# Patient Record
Sex: MALE
[De-identification: names, ages, dates, MRNs, and addresses within clinical notes are randomized per-mention and may not be internally consistent; named-entity substitution may affect disease eponyms.]

---

## 2019-10-24 ENCOUNTER — APPOINTMENT (OUTPATIENT)
Dept: ORTHOPEDIC SURGERY | Facility: CLINIC | Age: 49
End: 2019-10-24

## 2019-10-24 ENCOUNTER — APPOINTMENT (OUTPATIENT)
Dept: ORTHOPEDIC SURGERY | Facility: CLINIC | Age: 49
End: 2019-10-24
Payer: COMMERCIAL

## 2019-10-24 VITALS — WEIGHT: 220 LBS | HEIGHT: 70 IN | BODY MASS INDEX: 31.5 KG/M2

## 2019-10-24 DIAGNOSIS — Z86.39 PERSONAL HISTORY OF OTHER ENDOCRINE, NUTRITIONAL AND METABOLIC DISEASE: ICD-10-CM

## 2019-10-24 DIAGNOSIS — Z87.39 PERSONAL HISTORY OF OTHER DISEASES OF THE MUSCULOSKELETAL SYSTEM AND CONNECTIVE TISSUE: ICD-10-CM

## 2019-10-24 DIAGNOSIS — M25.511 PAIN IN RIGHT SHOULDER: ICD-10-CM

## 2019-10-24 DIAGNOSIS — M25.512 PAIN IN RIGHT SHOULDER: ICD-10-CM

## 2019-10-24 PROBLEM — Z00.00 ENCOUNTER FOR PREVENTIVE HEALTH EXAMINATION: Status: ACTIVE | Noted: 2019-10-24

## 2019-10-24 PROCEDURE — 20611 DRAIN/INJ JOINT/BURSA W/US: CPT | Mod: 50

## 2019-10-24 PROCEDURE — 73030 X-RAY EXAM OF SHOULDER: CPT | Mod: 50

## 2019-10-24 PROCEDURE — 99204 OFFICE O/P NEW MOD 45 MIN: CPT | Mod: 25

## 2019-10-24 RX ORDER — HYALURONATE SODIUM, STABILIZED 88 MG/4 ML
88 SYRINGE (ML) INTRAARTICULAR
Qty: 1 | Refills: 0 | Status: ACTIVE | COMMUNITY
Start: 2019-10-24 | End: 1900-01-01

## 2019-10-24 RX ORDER — ROSUVASTATIN CALCIUM 10 MG/1
10 TABLET, FILM COATED ORAL
Refills: 0 | Status: ACTIVE | COMMUNITY

## 2019-10-24 NOTE — REVIEW OF SYSTEMS
[Joint Stiffness] : joint stiffness [Joint Pain] : joint pain [Feeling Tired] : fatigue [Anxiety] : anxiety [Sleep Disturbances] : ~T sleep disturbances [Recent Weight Gain (___ Lbs)] : recent ~M [unfilled] lbs weight gain [Muscle Weakness] : muscle weakness

## 2019-10-24 NOTE — PHYSICAL EXAM
[de-identified] : PHYSICAL EXAM LEFT AND RIGHT SHOULDER\par \par NORMAL POSTURE \par AROM \par LEFT  130 / 130 / 80 / 20\par RIGHT   140 / 140 / 90 / 30\par TENDER: SA REGION / GH JOINT \par \par SPECIAL TESTING :\par MORA - POSITIVE \par JASPAL - POSITIVE \par SPEED TEST - POSITIVE\par \par QUIROGA - NEGATIVE \par APPREHENSION AND SUPPRESSION - NEGATIVE \par \par RC STRENGTH TESTING \par SS:  5/5\par SUB 5/5\par IS     5/5\par BICEPS  5/5\par \par SENSATION  - GROSSLY INTACT\par \par \par  [de-identified] : LEFT SHOULDER XRAY -  \par NO OBVIOUS FRACTURE , \par GRADE 2 OSTEOARTHRITIS, \par TYPE 3B ACROMION \par \par \par \par RIGHT SHOULDER XRAY -  \par NO OBVIOUS FRACTURE , \par GRADE 1  OSTEOARTHRITIS,\par TYPE 3B ACROMION \par \par

## 2019-10-24 NOTE — PROCEDURE
[de-identified] : INJECTION LEFT AND RIGHT  SHOULDER GH JOINT \par \par Patient has demonstrated limited relief from NSAIDS, rest, exercises / PT, and after discussion of the risks and benefits, the patient has elected to proceed with an ULTRASOUND GUIDED injection into the LEFT AND RIGHT  GLENOHUMERAL JOINT - POSTERIOR APPROACH \par  \par Confirmed that the patient does not have history of prior adverse reactions, active, infections, or relevant allergies. There was no effusion, erythema, or warmth, and the skin was clear\par \par The skin was sterilized with alcohol. Ethyl Chloride was used as a topical anesthetic. Routine sterile technique. \par The site was injected UTILIZING ULTRASOUND GUIDANCE to confirm appropriate placement of the needle-\par with a mixture of medication and local anesthetic. The injection was completed without complication and a bandage was applied.\par  \par The patient tolerated the procedure well and was given post-injection instructions.Rec: Cold therapy, analgesics, avoid heavy activity.\par MEDICATION: 4cc of 1% xylocaine + 10mg of KENALOG\par \par

## 2019-10-24 NOTE — HISTORY OF PRESENT ILLNESS
[de-identified] : BILATERAL SHOULDER PAIN - LEFT GREATER THAN RIGHT \par FLARE UP AUGUST 2019\par PAIN LEVEL 7/10\par CONSTANT & INTERMITTENT PAIN\par SHARP, ACHY\par BETTER WITH REST, ICE, HEAT\par WORSE WITH LYING DOWN, LIFTING, REACHING ACROSS AND BEHIND RADIATING PAIN\par NUMBNESS\par STIFFNESS

## 2019-11-01 ENCOUNTER — APPOINTMENT (OUTPATIENT)
Dept: ORTHOPEDIC SURGERY | Facility: CLINIC | Age: 49
End: 2019-11-01
Payer: COMMERCIAL

## 2019-11-01 VITALS — BODY MASS INDEX: 31.5 KG/M2 | HEIGHT: 70 IN | WEIGHT: 220 LBS

## 2019-11-01 PROCEDURE — 99213 OFFICE O/P EST LOW 20 MIN: CPT | Mod: 25

## 2019-11-01 PROCEDURE — 20611 DRAIN/INJ JOINT/BURSA W/US: CPT | Mod: RT

## 2019-11-01 NOTE — DISCUSSION/SUMMARY
[de-identified] : POST INJECTION INSTRUCTIONS\par \par COLD THERAPY , ANALGESICS PRN\par \par HOME STRETCHING AND EXERCISES QD\par \par MONOVISC LEFT SHOULDER GH JOINTG

## 2019-11-01 NOTE — PHYSICAL EXAM
[de-identified] : PHYSICAL EXAM LEFT AND RIGHT SHOULDER\par \par NORMAL POSTURE \par AROM \par LEFT  130 / 130 / 80 / 20\par RIGHT   140 / 140 / 90 / 30\par TENDER: SA REGION / GH JOINT \par \par SPECIAL TESTING :\par MORA - POSITIVE \par JASPAL - POSITIVE \par SPEED TEST - POSITIVE\par \par QUIROGA - NEGATIVE \par APPREHENSION AND SUPPRESSION - NEGATIVE \par \par RC STRENGTH TESTING \par SS:  5/5\par SUB 5/5\par IS     5/5\par BICEPS  5/5\par \par SENSATION  - GROSSLY INTACT\par \par \par

## 2019-11-01 NOTE — PROCEDURE
[de-identified] : INJECTION VISCOSUPPLEMENT RIGHT  SHOULDER GH JOINT\par \par Patient has demonstrated limited relief from NSAIDS, rest, exercises / PT, and after discussion of the risks and benefits, the patient has elected to proceed with an ULTRASOUND GUIDED injection into the RIGHT SHOULDER GH JOINT \par  \par Confirmed that the patient does not have history of prior adverse reactions, active, infections, or relevant allergies. There was no effusion, erythema, or warmth, and the skin was clear\par \par The skin was sterilized with alcohol. Ethyl Chloride was used as a topical anesthetic. Routine sterile technique. \par The site was injected UTILIZING ULTRASOUND GUIDANCE to confirm appropriate placement of the needle-\par with VISCOSUPPLEMENT. The injection was completed without complication and a bandage was applied.\par  \par The patient tolerated the procedure well and was given post-injection instructions.Rec: Cold therapy, analgesics, avoid heavy activity.\par MEDICATION: MONOVISC 4cc\par \par

## 2019-11-01 NOTE — HISTORY OF PRESENT ILLNESS
[de-identified] : RIGHT SHOULDER\par FOLLOW UP\par PAIN LEVEL 6/10\par CORTISONE INJECTION 10/24/19- HELPED SIGNIFICANTLY\par MONOVISC INJ TODAY

## 2021-12-01 ENCOUNTER — APPOINTMENT (OUTPATIENT)
Dept: ORTHOPEDIC SURGERY | Facility: CLINIC | Age: 51
End: 2021-12-01
Payer: COMMERCIAL

## 2021-12-01 DIAGNOSIS — M19.011 PRIMARY OSTEOARTHRITIS, RIGHT SHOULDER: ICD-10-CM

## 2021-12-01 DIAGNOSIS — M19.012 PRIMARY OSTEOARTHRITIS, LEFT SHOULDER: ICD-10-CM

## 2021-12-01 PROCEDURE — 99213 OFFICE O/P EST LOW 20 MIN: CPT

## 2021-12-01 PROCEDURE — 73030 X-RAY EXAM OF SHOULDER: CPT | Mod: 50

## 2021-12-01 RX ORDER — HYALURONATE SODIUM, STABILIZED 88 MG/4 ML
88 SYRINGE (ML) INTRAARTICULAR
Qty: 2 | Refills: 0 | Status: ACTIVE | OUTPATIENT
Start: 2021-12-01

## 2021-12-01 NOTE — DISCUSSION/SUMMARY
[de-identified] : SUBMIT AUTHO REQUEST  FOR MONOVISC BILATERAL SHOULDERS \par PATIENT HAS DEFERRED KENALOG INJECTIONS \par CONSIDER P.T.

## 2021-12-01 NOTE — HISTORY OF PRESENT ILLNESS
[de-identified] : BILATERAL SHOULDER PAIN \par LEFT WORSE THAN RIGHT \par PAIN FLARED UP ONE MONTH AGO-FELT A STABBING PAIN\par INTERMITTENT \par CRACKING SOUND\par PAIN LEVEL: 3/10, 9/10 \par WORSE WITH OVER HEAD LIFTING, LYING DOWN, IN THE MORNING\par BETTER WITH HEAT, ICE, TYLENOL, ASPER CREAM, TIGER BALM \par NOVEMBER 1, 2019 -PREVIOUS GEL INJECTION-HELPFUL FOR 6 MONTHS \par WENT TO P.T- HELPFUL

## 2021-12-01 NOTE — PHYSICAL EXAM
[de-identified] : PHYSICAL EXAM LEFT AND RIGHT SHOULDER\par \par NORMAL POSTURE \par AROM \par LEFT    120 / 120 / 60 / 20 \par RIGHT  130 / 130 / 90 / 20 \par TENDER: SA REGION / GH JOINT \par \par SPECIAL TESTING :\par MORA - POSITIVE \par JASPAL - POSITIVE \par SPEED TEST - POSITIVE\par \par QUIROGA - NEGATIVE \par APPREHENSION AND SUPPRESSION - NEGATIVE \par \par RC STRENGTH TESTING \par SS:  5/5\par SUB 5/5\par IS     5/5\par BICEPS  5/5\par \par SENSATION  - GROSSLY INTACT\par \par \par  [de-identified] : LEFT SHOULDER XRAY (2 VIEWS - AP AND OUTLET) -  \par NO OBVIOUS FRACTURE , SEPARATION OR DISLOCATION \par GRADE 1-2  OSTEOARTHRITIS, 8MM INFERIOR OSTEOPHYTE\par LITTLE CHANGE FROM 2019 XRAYS \par \par \par RIGHT SHOULDER XRAY (2 VIEWS - AP AND OUTLET) -  \par NO OBVIOUS FRACTURE ,  SEPARATION OR DISLOCATION \par GRADE 0-1 OSTEOARTHRITIS, 2-3MM OSTEOPHYTE \par \par

## 2024-05-22 ENCOUNTER — APPOINTMENT (OUTPATIENT)
Dept: ORTHOPEDIC SURGERY | Facility: CLINIC | Age: 54
End: 2024-05-22
Payer: COMMERCIAL

## 2024-05-22 DIAGNOSIS — M19.029 PRIMARY OSTEOARTHRITIS, UNSPECIFIED ELBOW: ICD-10-CM

## 2024-05-22 DIAGNOSIS — M19.021 PRIMARY OSTEOARTHRITIS, RIGHT ELBOW: ICD-10-CM

## 2024-05-22 DIAGNOSIS — M25.821 OTHER SPECIFIED JOINT DISORDERS, RIGHT ELBOW: ICD-10-CM

## 2024-05-22 PROCEDURE — 99203 OFFICE O/P NEW LOW 30 MIN: CPT | Mod: 25

## 2024-05-22 PROCEDURE — 73070 X-RAY EXAM OF ELBOW: CPT | Mod: RT

## 2024-05-22 PROCEDURE — 20606 DRAIN/INJ JOINT/BURSA W/US: CPT

## 2024-05-22 NOTE — PHYSICAL EXAM
[de-identified] : PHYSICAL EXAM RIGHT ELBOW  NO OBVIOUS DEFORMITY OR ANGULATION  NO NOTABLE SWELLING OR REDNESS  AROM  FLEXION / EXTENSION - 130 - 20  SUPINATION / PRONATION - 80 / 80   NO VARUS / VALGUS INSTABILITY   TENDER AT THE OLECRANON WORSE WITH FORCED EXTENTION   MOTOR STRENGTH - GROSSLY NORMAL SENSATION - GROSSLY NORMAL [de-identified] : XRAY RIGHT ELBOW (2VIEWS) NO OBVIOUS FRACTURE ,  DISLOCATION  JOINT  ALIGNMENT ARE NORMAL  MILD - MODERATE OSTEOARTHRITIS, POSTERIOR HUMERAL AMD OLECRANON  "KISSING SPURS"

## 2024-05-22 NOTE — DISCUSSION/SUMMARY
[de-identified] : DX: OLECRANON IMPINGEMENT  PATIENT CONSENTED TO KENALOG INJECTION - SEE PROCEDURE NOTE  POST INJECTION INSTRUCTIONS: COLD PACKS 48 HOURS PRN  IBU 2 OR 3 TABS TID  P.T. - 2 X 4 WEEKS - THEN HOME EXERCISES  CONSIDER ARTHROSCOPIC RESECTION OF SPURS

## 2024-05-22 NOTE — HISTORY OF PRESENT ILLNESS
[de-identified] : LOCATION: RIGHT ELBOW- RHD  DURATION: PAIN STARTED  MOST RECENT INJRUY 1 YEAR AGO-PT FELL AT HOME  QUALITY: SHARP / LOCKING  INTERMITTENT / LOCALIZED   PAIN LEVEL:8/10  TREATMENTS: PATIENT HAS TRIED REST, TYLENOL  AGGRAVATING FACTORS: PAIN WORSENS WITH LIFTING, EXTENDING ARM RANGE OF MOTION  PAIN WORSE DURING THE DAY / AT NIGHT / SLEEPING ASSOCIATED SYMPTOMS: NUMBNESS / TINGLING  ASSOCIATED GRINDING